# Patient Record
Sex: FEMALE | Race: WHITE
[De-identification: names, ages, dates, MRNs, and addresses within clinical notes are randomized per-mention and may not be internally consistent; named-entity substitution may affect disease eponyms.]

---

## 2020-07-01 ENCOUNTER — HOSPITAL ENCOUNTER (OUTPATIENT)
Dept: HOSPITAL 76 - LAB.WCP | Age: 65
Discharge: HOME | End: 2020-07-01
Attending: NURSE PRACTITIONER
Payer: COMMERCIAL

## 2020-07-01 DIAGNOSIS — I10: Primary | ICD-10-CM

## 2020-07-01 DIAGNOSIS — E03.9: ICD-10-CM

## 2020-07-01 LAB
ALBUMIN DIAFP-MCNC: 4 G/DL (ref 3.2–5.5)
ALBUMIN/GLOB SERPL: 1.5 {RATIO} (ref 1–2.2)
ALP SERPL-CCNC: 74 IU/L (ref 42–121)
ALT SERPL W P-5'-P-CCNC: 47 IU/L (ref 10–60)
ANION GAP SERPL CALCULATED.4IONS-SCNC: 11 MMOL/L (ref 6–13)
AST SERPL W P-5'-P-CCNC: 22 IU/L (ref 10–42)
BASOPHILS NFR BLD AUTO: 0.1 10^3/UL (ref 0–0.1)
BASOPHILS NFR BLD AUTO: 0.6 %
BILIRUB BLD-MCNC: 1.2 MG/DL (ref 0.2–1)
BUN SERPL-MCNC: 27 MG/DL (ref 6–20)
CALCIUM UR-MCNC: 8.7 MG/DL (ref 8.5–10.3)
CHLORIDE SERPL-SCNC: 102 MMOL/L (ref 101–111)
CHOLEST SERPL-MCNC: 238 MG/DL
CO2 SERPL-SCNC: 25 MMOL/L (ref 21–32)
CREAT SERPLBLD-SCNC: 0.8 MG/DL (ref 0.4–1)
EOSINOPHIL # BLD AUTO: 0.5 10^3/UL (ref 0–0.7)
EOSINOPHIL NFR BLD AUTO: 5.9 %
ERYTHROCYTE [DISTWIDTH] IN BLOOD BY AUTOMATED COUNT: 13.4 % (ref 12–15)
GLOBULIN SER-MCNC: 2.6 G/DL (ref 2.1–4.2)
GLUCOSE SERPL-MCNC: 101 MG/DL (ref 70–100)
HDLC SERPL-MCNC: 68 MG/DL
HDLC SERPL: 3.5 {RATIO} (ref ?–4.4)
HGB UR QL STRIP: 13.9 G/DL (ref 12–16)
LDLC SERPL CALC-MCNC: 122 MG/DL
LDLC/HDLC SERPL: 1.8 {RATIO} (ref ?–4.4)
LYMPHOCYTES # SPEC AUTO: 2 10^3/UL (ref 1.5–3.5)
LYMPHOCYTES NFR BLD AUTO: 25.4 %
MCH RBC QN AUTO: 33.1 PG (ref 27–31)
MCHC RBC AUTO-ENTMCNC: 32.9 G/DL (ref 32–36)
MCV RBC AUTO: 100.5 FL (ref 81–99)
MONOCYTES # BLD AUTO: 0.7 10^3/UL (ref 0–1)
MONOCYTES NFR BLD AUTO: 8.8 %
NEUTROPHILS # BLD AUTO: 4.6 10^3/UL (ref 1.5–6.6)
NEUTROPHILS # SNV AUTO: 7.8 X10^3/UL (ref 4.8–10.8)
NEUTROPHILS NFR BLD AUTO: 58.3 %
PDW BLD AUTO: 9.8 FL (ref 7.9–10.8)
PLATELET # BLD: 292 10^3/UL (ref 130–450)
PROT SPEC-MCNC: 6.6 G/DL (ref 6.7–8.2)
RBC MAR: 4.2 10^6/UL (ref 4.2–5.4)
SODIUM SERPLBLD-SCNC: 138 MMOL/L (ref 135–145)
VLDLC SERPL-SCNC: 48 MG/DL

## 2020-07-01 PROCEDURE — 85025 COMPLETE CBC W/AUTO DIFF WBC: CPT

## 2020-07-01 PROCEDURE — 83721 ASSAY OF BLOOD LIPOPROTEIN: CPT

## 2020-07-01 PROCEDURE — 84443 ASSAY THYROID STIM HORMONE: CPT

## 2020-07-01 PROCEDURE — 36415 COLL VENOUS BLD VENIPUNCTURE: CPT

## 2020-07-01 PROCEDURE — 80053 COMPREHEN METABOLIC PANEL: CPT

## 2020-07-01 PROCEDURE — 80061 LIPID PANEL: CPT

## 2021-02-03 ENCOUNTER — HOSPITAL ENCOUNTER (OUTPATIENT)
Dept: HOSPITAL 76 - LAB.WCP | Age: 66
Discharge: HOME | End: 2021-02-03
Attending: NURSE PRACTITIONER
Payer: COMMERCIAL

## 2021-02-03 DIAGNOSIS — I10: ICD-10-CM

## 2021-02-03 DIAGNOSIS — E78.49: ICD-10-CM

## 2021-02-03 DIAGNOSIS — L30.9: ICD-10-CM

## 2021-02-03 DIAGNOSIS — Z79.899: ICD-10-CM

## 2021-02-03 DIAGNOSIS — Z00.00: Primary | ICD-10-CM

## 2021-02-03 DIAGNOSIS — E03.9: ICD-10-CM

## 2021-02-03 LAB
ALBUMIN DIAFP-MCNC: 4.2 G/DL (ref 3.2–5.5)
ALBUMIN/GLOB SERPL: 1.4 {RATIO} (ref 1–2.2)
ALP SERPL-CCNC: 97 IU/L (ref 42–121)
ALT SERPL W P-5'-P-CCNC: 43 IU/L (ref 10–60)
ANION GAP SERPL CALCULATED.4IONS-SCNC: 10 MMOL/L (ref 6–13)
AST SERPL W P-5'-P-CCNC: 32 IU/L (ref 10–42)
BASOPHILS NFR BLD AUTO: 0.1 10^3/UL (ref 0–0.1)
BASOPHILS NFR BLD AUTO: 0.8 %
BILIRUB BLD-MCNC: 0.7 MG/DL (ref 0.2–1)
BUN SERPL-MCNC: 18 MG/DL (ref 6–20)
CALCIUM UR-MCNC: 9.3 MG/DL (ref 8.5–10.3)
CHLORIDE SERPL-SCNC: 95 MMOL/L (ref 101–111)
CHOLEST SERPL-MCNC: 218 MG/DL
CO2 SERPL-SCNC: 27 MMOL/L (ref 21–32)
CREAT SERPLBLD-SCNC: 0.7 MG/DL (ref 0.4–1)
EOSINOPHIL # BLD AUTO: 0.1 10^3/UL (ref 0–0.7)
EOSINOPHIL NFR BLD AUTO: 2 %
ERYTHROCYTE [DISTWIDTH] IN BLOOD BY AUTOMATED COUNT: 12.8 % (ref 12–15)
GLOBULIN SER-MCNC: 2.9 G/DL (ref 2.1–4.2)
GLUCOSE SERPL-MCNC: 111 MG/DL (ref 70–100)
HDLC SERPL-MCNC: 78 MG/DL
HDLC SERPL: 2.8 {RATIO} (ref ?–4.4)
HGB UR QL STRIP: 13.9 G/DL (ref 12–16)
LDLC SERPL CALC-MCNC: 124 MG/DL
LDLC/HDLC SERPL: 1.6 {RATIO} (ref ?–4.4)
LYMPHOCYTES # SPEC AUTO: 1.5 10^3/UL (ref 1.5–3.5)
LYMPHOCYTES NFR BLD AUTO: 22.9 %
MCH RBC QN AUTO: 32.6 PG (ref 27–31)
MCHC RBC AUTO-ENTMCNC: 33.2 G/DL (ref 32–36)
MCV RBC AUTO: 98.1 FL (ref 81–99)
MONOCYTES # BLD AUTO: 0.6 10^3/UL (ref 0–1)
MONOCYTES NFR BLD AUTO: 9.3 %
NEUTROPHILS # BLD AUTO: 4.2 10^3/UL (ref 1.5–6.6)
NEUTROPHILS # SNV AUTO: 6.5 X10^3/UL (ref 4.8–10.8)
NEUTROPHILS NFR BLD AUTO: 64.4 %
PDW BLD AUTO: 9.4 FL (ref 7.9–10.8)
PLATELET # BLD: 325 10^3/UL (ref 130–450)
PROT SPEC-MCNC: 7.1 G/DL (ref 6.7–8.2)
RBC MAR: 4.27 10^6/UL (ref 4.2–5.4)
VLDLC SERPL-SCNC: 16 MG/DL

## 2021-02-03 PROCEDURE — 36415 COLL VENOUS BLD VENIPUNCTURE: CPT

## 2021-02-03 PROCEDURE — 80061 LIPID PANEL: CPT

## 2021-02-03 PROCEDURE — 84443 ASSAY THYROID STIM HORMONE: CPT

## 2021-02-03 PROCEDURE — 83721 ASSAY OF BLOOD LIPOPROTEIN: CPT

## 2021-02-03 PROCEDURE — 85025 COMPLETE CBC W/AUTO DIFF WBC: CPT

## 2021-02-03 PROCEDURE — 80053 COMPREHEN METABOLIC PANEL: CPT

## 2022-07-13 ENCOUNTER — HOSPITAL ENCOUNTER (OUTPATIENT)
Dept: HOSPITAL 76 - DI | Age: 67
Discharge: HOME | End: 2022-07-13
Attending: PHYSICIAN ASSISTANT
Payer: COMMERCIAL

## 2022-07-13 DIAGNOSIS — E03.9: ICD-10-CM

## 2022-07-13 DIAGNOSIS — K76.0: ICD-10-CM

## 2022-07-13 DIAGNOSIS — E78.5: ICD-10-CM

## 2022-07-13 DIAGNOSIS — R10.11: ICD-10-CM

## 2022-07-13 DIAGNOSIS — K80.20: Primary | ICD-10-CM

## 2022-07-13 LAB
ALBUMIN DIAFP-MCNC: 4.2 G/DL (ref 3.2–5.5)
ALBUMIN/GLOB SERPL: 1.4 {RATIO} (ref 1–2.2)
ALP SERPL-CCNC: 92 IU/L (ref 42–121)
ALT SERPL W P-5'-P-CCNC: 25 IU/L (ref 10–60)
ANION GAP SERPL CALCULATED.4IONS-SCNC: 10 MMOL/L (ref 6–13)
AST SERPL W P-5'-P-CCNC: 19 IU/L (ref 10–42)
BILIRUB BLD-MCNC: 0.6 MG/DL (ref 0.2–1)
BUN SERPL-MCNC: 11 MG/DL (ref 6–20)
CALCIUM UR-MCNC: 9.5 MG/DL (ref 8.5–10.3)
CHLORIDE SERPL-SCNC: 93 MMOL/L (ref 101–111)
CHOLEST SERPL-MCNC: 192 MG/DL
CO2 SERPL-SCNC: 30 MMOL/L (ref 21–32)
CREAT SERPLBLD-SCNC: 0.8 MG/DL (ref 0.4–1)
GFRSERPLBLD MDRD-ARVRAT: 72 ML/MIN/{1.73_M2} (ref 89–?)
GLOBULIN SER-MCNC: 3 G/DL (ref 2.1–4.2)
GLUCOSE SERPL-MCNC: 109 MG/DL (ref 70–100)
HDLC SERPL-MCNC: 70 MG/DL
HDLC SERPL: 2.7 {RATIO} (ref ?–4.4)
LDLC SERPL CALC-MCNC: 105 MG/DL
LDLC/HDLC SERPL: 1.5 {RATIO} (ref ?–4.4)
POTASSIUM SERPL-SCNC: 3.4 MMOL/L (ref 3.5–5)
PROT SPEC-MCNC: 7.2 G/DL (ref 6.7–8.2)
SODIUM SERPLBLD-SCNC: 133 MMOL/L (ref 135–145)
TRIGL P FAST SERPL-MCNC: 83 MG/DL
VLDLC SERPL-SCNC: 17 MG/DL

## 2022-07-13 PROCEDURE — 36415 COLL VENOUS BLD VENIPUNCTURE: CPT

## 2022-07-13 PROCEDURE — 84443 ASSAY THYROID STIM HORMONE: CPT

## 2022-07-13 PROCEDURE — 80061 LIPID PANEL: CPT

## 2022-07-13 PROCEDURE — 83721 ASSAY OF BLOOD LIPOPROTEIN: CPT

## 2022-07-13 PROCEDURE — 80053 COMPREHEN METABOLIC PANEL: CPT

## 2022-07-13 NOTE — ULTRASOUND REPORT
PROCEDURE:  Abdomen Limited

 

INDICATIONS:  RIGHT UPPER QUAD PAIN

 

TECHNIQUE:  

Real-time scanning was performed of the abdominal and retroperitoneal organs, with image documentatio
n.  

 

COMPARISON:  None.

 

FINDINGS:  

 

Liver:  Liver is normal in size and increased echogenicity.   

 

Gallbladder: Sludge versus small stones are noted in the gallbladder lumen. Wall thickness is normal 
measuring 1.7 cm.

 

Biliary ducts:  Intrahepatic bile ducts are non-dilated.  Extrahepatic bile duct caliber measures 6.1
 mm.  Normal is 6-7 mm or less in diameter, or 10 mm or less post-cholecystectomy.  

 

Pancreas:  Visualized portions of the pancreas are sonographically normal.  

 

Kidneys:  Right kidney measures 10.4 cm long.  No hydronephrosis or nephrolithiasis.  No solid masses
.  

 

Aorta:  Visualized aorta is normal in caliber at less than 3 cm.  

 

Iliacs:  Proximal common iliac arteries are normal in caliber at less than 2.5 cm.  

 

IVC:  Intrahepatic inferior vena cava is patent.  

 

Miscellaneous:  No free abdominal fluid.  

 

IMPRESSION:  

Cholelithiasis without imaging evidence of cholecystitis.

 

Hepatic steatosis.

 

 

Reviewed by: Jodi Read MD on 7/13/2022 5:23 PM PDT

Approved by: Jodi Read MD on 7/13/2022 5:23 PM PDT

 

 

Station ID:  [id number]

## 2023-07-29 ENCOUNTER — HOSPITAL ENCOUNTER (OUTPATIENT)
Age: 68
End: 2023-07-29
Payer: MEDICARE

## 2023-07-29 DIAGNOSIS — R63.4: Primary | ICD-10-CM

## 2023-07-29 DIAGNOSIS — M51.36: ICD-10-CM

## 2023-07-29 DIAGNOSIS — M47.816: ICD-10-CM

## 2023-07-29 DIAGNOSIS — M48.061: ICD-10-CM

## 2023-07-29 LAB — ESTIMATED GLOMERULAR FILT RATE: > 60 ML/MIN (ref 60–?)

## 2023-07-29 PROCEDURE — 36415 COLL VENOUS BLD VENIPUNCTURE: CPT

## 2023-07-29 PROCEDURE — 74177 CT ABD & PELVIS W/CONTRAST: CPT

## 2023-07-29 PROCEDURE — 82565 ASSAY OF CREATININE: CPT
